# Patient Record
Sex: MALE | Race: WHITE | URBAN - METROPOLITAN AREA
[De-identification: names, ages, dates, MRNs, and addresses within clinical notes are randomized per-mention and may not be internally consistent; named-entity substitution may affect disease eponyms.]

---

## 2017-06-25 ENCOUNTER — TELEPHONE (OUTPATIENT)
Dept: TRANSPLANT | Facility: CLINIC | Age: 77
End: 2017-06-25

## 2017-06-25 NOTE — TELEPHONE ENCOUNTER
Increase Cr 1.6     Follows with Dr [facility]  Please call for most recent clinic note from Dr Garcia

## 2017-06-26 NOTE — TELEPHONE ENCOUNTER
Call placed to [facility]:  verbalizes that she will send over patient most recent clinic note, which is from February, and will place a note in patient chart informing provider to send patient's next clinic note after his appointment in August.

## 2019-11-08 ENCOUNTER — TRANSFERRED RECORDS (OUTPATIENT)
Dept: HEALTH INFORMATION MANAGEMENT | Facility: CLINIC | Age: 79
End: 2019-11-08

## 2020-01-01 ENCOUNTER — TRANSFERRED RECORDS (OUTPATIENT)
Dept: HEALTH INFORMATION MANAGEMENT | Facility: CLINIC | Age: 80
End: 2020-01-01

## 2020-01-13 ENCOUNTER — TRANSFERRED RECORDS (OUTPATIENT)
Dept: HEALTH INFORMATION MANAGEMENT | Facility: CLINIC | Age: 80
End: 2020-01-13

## 2020-01-14 ENCOUNTER — TRANSFERRED RECORDS (OUTPATIENT)
Dept: HEALTH INFORMATION MANAGEMENT | Facility: CLINIC | Age: 80
End: 2020-01-14

## 2020-03-05 ENCOUNTER — TRANSFERRED RECORDS (OUTPATIENT)
Dept: HEALTH INFORMATION MANAGEMENT | Facility: CLINIC | Age: 80
End: 2020-03-05

## 2020-03-05 LAB
CREAT SERPL-MCNC: 1.2 MG/DL (ref 0.8–1.5)
GFR SERPL CREATININE-BSD FRML MDRD: 59 ML/MIN/1.73M2
GLUCOSE SERPL-MCNC: 83 MG/DL (ref 60–99)
POTASSIUM SERPL-SCNC: 4.3 MEQ/L (ref 3.5–5.1)

## 2020-05-21 ENCOUNTER — TRANSFERRED RECORDS (OUTPATIENT)
Dept: HEALTH INFORMATION MANAGEMENT | Facility: CLINIC | Age: 80
End: 2020-05-21

## 2020-05-21 LAB
ALT SERPL-CCNC: 23 U/L (ref 18–65)
AST SERPL-CCNC: 19 U/L (ref 10–40)

## 2021-01-01 ENCOUNTER — TRANSFERRED RECORDS (OUTPATIENT)
Dept: HEALTH INFORMATION MANAGEMENT | Facility: CLINIC | Age: 81
End: 2021-01-01

## 2021-01-01 LAB
ALT SERPL-CCNC: 14 U/L (ref 18–65)
AST SERPL-CCNC: 21 U/L (ref 10–40)
CHOLESTEROL (EXTERNAL): 195 MG/DL
CREATININE (EXTERNAL): 0.81 MG/DL (ref 0.8–1.5)
CREATININE (EXTERNAL): 1.2 MG/DL (ref 0.8–1.5)
CREATININE (EXTERNAL): 1.2 MG/DL (ref 0.8–1.5)
GFR ESTIMATED (EXTERNAL): 28 ML/MIN/1.73M2
GFR ESTIMATED (EXTERNAL): 58 ML/MIN/1.73M2
GFR ESTIMATED (EXTERNAL): >60 ML/MIN/1.73M2
GLUCOSE (EXTERNAL): 110 MG/DL (ref 60–99)
GLUCOSE (EXTERNAL): 83 MG/DL (ref 60–99)
GLUCOSE (EXTERNAL): 95 MG/DL (ref 60–99)
HDLC SERPL-MCNC: 70 MG/DL (ref 40–60)
INR (EXTERNAL): 1.1 (ref 0.9–1.1)
LDL CHOLESTEROL CALCULATED (EXTERNAL): 88 MG/DL
POTASSIUM (EXTERNAL): 3.4 MMOL/L (ref 3.5–5.1)
POTASSIUM (EXTERNAL): 4.6 MMOL/L (ref 3.5–5.1)
POTASSIUM (EXTERNAL): 5.3 MMOL/L (ref 3.5–5.1)
TRIGLYCERIDES (EXTERNAL): 186 MG/DL

## 2022-08-13 ENCOUNTER — EXTERNAL ORDER RESULTS (OUTPATIENT)
Dept: LAB | Facility: CLINIC | Age: 82
End: 2022-08-13

## 2022-08-22 ENCOUNTER — MEDICAL CORRESPONDENCE (OUTPATIENT)
Dept: HEALTH INFORMATION MANAGEMENT | Facility: CLINIC | Age: 82
End: 2022-08-22

## 2022-08-24 LAB
ABSOLUTE BASOPHILS (EXTERNAL): 0 K/UL (ref 0–0.5)
ABSOLUTE EOSINOPHILS (EXTERNAL): 0.1 K/UL (ref 0–0.8)
ABSOLUTE IMMATURE GRANULOCYTES (EXTERNAL): 0.21 K/UL (ref 0–0.07)
ABSOLUTE LYMPHOCYTES (EXTERNAL): 1.6 K/UL (ref 0.3–5)
ABSOLUTE MONOCYTES (EXTERNAL): 1.4 K/UL (ref 0.1–1.3)
ABSOLUTE NEUTROPHILS (EXTERNAL): 8.7 K/UL (ref 1.3–8.8)
ANION GAP SERPL CALC-SCNC: 9 MMOL/L (ref 5–15)
APPEARANCE UR: CLEAR
BILIRUB UR QL: NEGATIVE
BLOOD URINE (EXTERNAL): NEGATIVE
BUN SERPL-MCNC: 47 MG/DL (ref 7–24)
CALCIUM (EXTERNAL): 9.9 MG/DL (ref 8.3–10.7)
CHLORIDE (EXTERNAL): 107 MMOL/L (ref 98–107)
CO2 (EXTERNAL): 27 MMOL/L (ref 21–32)
COLOR UR: YELLOW
COVID-19 VIRUS BY PCR (EXTERNAL RESULT): NORMAL
CREATININE (EXTERNAL): 1.6 MG/DL (ref 0.8–1.5)
CRP INFLAMMATION (EXTERNAL): 9.5 MG/L (ref 0–5)
EBV DNA LOG OF COPIES (EXTERNAL): NORMAL LOG IU/ML
ERYTHROCYTE [DISTWIDTH] IN BLOOD BY AUTOMATED COUNT: 14.9 % (ref 11.5–15)
GFR ESTIMATED (EXTERNAL): 42 ML/MIN/1.73M2
GLUCOSE (EXTERNAL): 166 MG/DL (ref 60–99)
GLUCOSE UR STRIP-MCNC: NEGATIVE MG/DL
HEMATOCRIT (EXTERNAL): 40.4 % (ref 40–52)
HEMOGLOBIN (EXTERNAL): 12.7 G/DL (ref 13.3–17.7)
KETONES UR QL STRIP: NEGATIVE MG/DL
LEUKOCYTE ESTERASE URINE (EXTERNAL): NEGATIVE
Lab: NORMAL IU/ML
MCH RBC QN AUTO: 32.9 PG (ref 27–34)
MCHC RBC AUTO-ENTMCNC: 31.4 G/DL (ref 31–36)
MCV RBC AUTO: 104.7 FL (ref 80–100)
METHOD (EXTERNAL): ABNORMAL
NITRITES URINE (EXTERNAL): NEGATIVE
PH UR: 5.5 [PH]
PLATELET COUNT (EXTERNAL): 153 K/UL (ref 150–450)
POTASSIUM (EXTERNAL): 4 MMOL/L (ref 3.5–5.1)
PROTEIN ALBUMIN UR (EXTERNAL): NEGATIVE MG/DL
RBC # BLD AUTO: 3.86 M/UL (ref 4.4–5.9)
SODIUM (EXTERNAL): 143 MMOL/L (ref 136–145)
SPECIFIC GRAVITY URINE (EXTERNAL): 1.02
UROBILINOGEN (EXTERNAL): 0.2 EU/DL (ref 0.2–1)
WBC COUNT (EXTERNAL): 11.9 K/UL (ref 3.5–11)

## 2022-09-07 ENCOUNTER — POST MORTEM DOCUMENTATION (OUTPATIENT)
Dept: TRANSPLANT | Facility: CLINIC | Age: 82
End: 2022-09-07

## 2022-09-07 NOTE — PROGRESS NOTES
Received notification of patient's death from annual survey.  Place of death was reported as home.  Graft status at the time of death was reported as Functioning.  TIS verification is: Complete